# Patient Record
Sex: MALE | Race: WHITE
[De-identification: names, ages, dates, MRNs, and addresses within clinical notes are randomized per-mention and may not be internally consistent; named-entity substitution may affect disease eponyms.]

---

## 2018-01-12 ENCOUNTER — HOSPITAL ENCOUNTER (OUTPATIENT)
Dept: HOSPITAL 56 - MW.SDS | Age: 38
Discharge: HOME | End: 2018-01-12
Attending: SURGERY
Payer: COMMERCIAL

## 2018-01-12 VITALS — SYSTOLIC BLOOD PRESSURE: 124 MMHG | DIASTOLIC BLOOD PRESSURE: 72 MMHG

## 2018-01-12 DIAGNOSIS — F17.210: ICD-10-CM

## 2018-01-12 DIAGNOSIS — G56.01: Primary | ICD-10-CM

## 2018-01-12 PROCEDURE — 64721 CARPAL TUNNEL SURGERY: CPT

## 2018-01-12 NOTE — PCM.OPNOTE
- General Post-Op/Procedure Note


Date of Surgery/Procedure: 01/12/18


Operative Procedure(s): right carpal tunnel release


Pre Op Diagnosis: right carpal tunnel syndrome


Post-Op Diagnosis: Same


Anesthesia Technique: Local, MAC


Primary Surgeon: Tamara Ohara


Assistant: Cesia Amaya


Complications: None


Condition: Good


Free Text/Narrative:: 


 Intake & Output











 01/11/18 01/12/18 01/12/18





 23:59 07:59 15:59


 


Intake Total   1930


 


Balance   1930

## 2018-01-12 NOTE — PCM.PREANE
Preanesthetic Assessment





- Anesthesia/Transfusion/Family Hx


Anesthesia History: Prior Anesthesia Without Reaction


Family History of Anesthesia Reaction: No


Transfusion History: No Prior Transfusion(s)


Intubation History: Unknown





- Review of Systems


General: No Symptoms


Pulmonary: No Symptoms


Cardiovascular: No Symptoms


Gastrointestinal: No Symptoms, Nausea


Other: Reports: None





- Physical Assessment


Height: 1.73 m


Weight: 87.543 kg


ASA Class: 1


Mental Status: Alert & Oriented x3


Airway Class: Mallampati = 2


Dentition: Reports: Normal Dentition


Thyro-Mental Finger Breadths: 3


Mouth Opening Finger Breadths: 3


ROM/Head Extension: Full


Lungs: Clear to Auscultation, Normal Respiratory Effort


Cardiovascular: Regular Rate, Regular Rhythm





- Allergies


Allergies/Adverse Reactions: 


 Allergies











Allergy/AdvReac Type Severity Reaction Status Date / Time


 


No Known Allergies Allergy   Verified 01/09/18 13:23














- Blood


Blood Available: Yes





- Anesthesia Plan


Pre-Op Medication Ordered: None





- Acknowledgements


Anesthesia Type Planned: MAC


Pt an Appropriate Candidate for the Planned Anesthesia: Yes


Alternatives and Risks of Anesthesia Discussed w Pt/Guardian: Yes


Pt/Guardian Understands and Agrees with Anesthesia Plan: Yes





PreAnesthesia Questionnaire


Other HEENT History: wears glasses


Neurological History: Reports: Concussion





- Infectious Disease History


Infectious Disease History: Reports: Chicken Pox





- Past Surgical History


HEENT Surgical History: Reports: Oral Surgery


Other HEENT Surgeries/Procedures: wisdom teeth


Male  Surgical History: Reports: Vasectomy


Musculoskeletal Surgical History: Reports: Other (See Below)


Other Musculoskeletal Surgeries/Procedures:: surgical intervention for snake 

bite right hand





- SUBSTANCE USE


Smoking Status *Q: Current Every Day Smoker


Tobacco Use Within Last Twelve Months: Smokeless Tobacco


Days Per Week of Alcohol Use: 5


Number of Drinks Per Day: 2


Total Drinks Per Week: 10


Recreational Drug Use History: No





- HOME MEDS


Home Medications: 


 Home Meds





. [No Known Home Meds]  03/18/17 [History]











- CURRENT (IN HOUSE) MEDS


Current Meds: 





 Current Medications





Hydrocodone Bitart/Acetaminophen (Norco 325-5 Mg)  1 tab PO Q4H PRN


   PRN Reason: Pain


Bupivacaine HCl/Epinephrine Bitart (Marcaine 0.25%/Epinephrine 1:200,000)  10 

ml INJECT ONETIME ONE


   Stop: 01/12/18 08:01


Cefazolin Sodium/Dextrose 2 gm (/ Premix)  50 mls @ 100 mls/hr IV ONETIME ONE


   Stop: 01/12/18 08:29


Lactated Ringer's (Ringers, Lactated)  1,000 mls @ 125 mls/hr IV ASDIRECTED HUNG


   Last Admin: 01/12/18 07:46 Dose:  125 mls/hr





Discontinued Medications





Fentanyl (Sublimaze) Confirm Administered Dose 100 mcg .ROUTE .STK-MED ONE


   Stop: 01/12/18 07:31


Bupivacaine HCl/Epinephrine Bitart (Sensorc Mpf 0.25%-Epi 1:196104) Confirm 

Administered Dose 30 mls @ as directed .ROUTE .STK-MED ONE


   Stop: 01/12/18 07:27


Ketorolac Tromethamine (Toradol) Confirm Administered Dose 30 mg .ROUTE .STK-

MED ONE


   Stop: 01/12/18 07:32


Lidocaine (Xylocaine-Mpf 2%) Confirm Administered Dose 5 ml .ROUTE .STK-MED ONE


   Stop: 01/12/18 07:31


Midazolam HCl (Versed 1 Mg/Ml) Confirm Administered Dose 2 mg .ROUTE .STK-MED 

ONE


   Stop: 01/12/18 07:31


Ondansetron HCl (Zofran) Confirm Administered Dose 4 mg .ROUTE .STK-MED ONE


   Stop: 01/12/18 07:32


Propofol (Diprivan  20 Ml) Confirm Administered Dose 200 mg .ROUTE .STK-MED ONE


   Stop: 01/12/18 07:31

## 2018-01-12 NOTE — PCM.POSTAN
POST ANESTHESIA ASSESSMENT





- MENTAL STATUS


Mental Status: Alert, Oriented





- RESPIRATORY


Respiratory Status: Respiratory Rate WNL, Airway Patent, O2 Saturation Stable





- CARDIOVASCULAR


CV Status: Pulse Rate WNL, Blood Pressure Stable





- GASTROINTESTINAL


GI Status: No Symptoms





- PAIN


Pain Score: 0





- POST OP HYDRATION


Hydration Status: Adequate & Stable





- OBSERVATIONS


Free Text/Narrative:: 





no anesthesia problems Never

## 2018-01-15 NOTE — OR
SURGEON:

JOVANY WAHL MD

 

DATE OF PROCEDURE:  01/12/2018

 

PREOPERATIVE DIAGNOSIS:

Carpal tunnel syndrome on the right.

 

POSTOPERATIVE DIAGNOSIS:

Carpal tunnel syndrome on the right.

 

PROCEDURE:

Right carpal tunnel release.

 

ASSISTANT:

DANIAL Burger.

 

ANESTHESIA:

Local MAC.

 

INDICATIONS:

Mr. Perry is a 37-year-old gentleman seen today for right carpal tunnel

syndrome.  Risks and benefits of release were discussed with him and he was in

agreement to proceed.  Risks were including, but not limited to, bleeding,

infection, damage to underlying or overlying structures, possible need for

future interventions, possible scarring.

 

PROCEDURE IN DETAIL:

After informed consent was obtained and placed on the chart, the patient was

brought to the operating theater and laid in supine position.  After adequate

local MAC anesthetic was obtained, the area was prepped and draped and a time-

out was completed to confirm side and site.

 

Attention was then paid to exsanguination of the arm and tourniquet was

insufflated to 200 mmHg.  The dissection was then carried over the transverse

carpal ligament through the skin and subcutaneous tissues until breached to the

ligament.  Dissection was then carried distally and proximally until complete

release.  The wound was irrigated, closed in a horizontal mattress fashion using

a 5-0 nylon stitches after copious irrigation.  The wound was then dressed with

Xeroform, fluffs, and a Kerlix gauze dressing and a 2-inch Ace wrap.  The

patient tolerated this well.  All counts and needles were correct at the end of

the case.

 

FOLLOWUP INSTRUCTIONS:

The patient will see us in 10 to 14 days or sooner if any problems, questions,

or concerns.  He was given a prescription for pain control.

 

 

HEGGTHE / MODL

DD:  01/15/2018 17:11:38

DT:  01/15/2018 23:49:27

Job #:  [id number]